# Patient Record
Sex: MALE | Race: WHITE | Employment: OTHER | URBAN - NONMETROPOLITAN AREA
[De-identification: names, ages, dates, MRNs, and addresses within clinical notes are randomized per-mention and may not be internally consistent; named-entity substitution may affect disease eponyms.]

---

## 2020-01-20 ENCOUNTER — OFFICE VISIT (OUTPATIENT)
Dept: FAMILY MEDICINE CLINIC | Age: 50
End: 2020-01-20
Payer: COMMERCIAL

## 2020-01-20 VITALS
OXYGEN SATURATION: 98 % | SYSTOLIC BLOOD PRESSURE: 130 MMHG | DIASTOLIC BLOOD PRESSURE: 82 MMHG | HEART RATE: 85 BPM | TEMPERATURE: 97.6 F | BODY MASS INDEX: 33.18 KG/M2 | WEIGHT: 237 LBS | HEIGHT: 71 IN

## 2020-01-20 PROCEDURE — 99213 OFFICE O/P EST LOW 20 MIN: CPT | Performed by: PHYSICIAN ASSISTANT

## 2020-01-20 RX ORDER — FENOFIBRATE 160 MG/1
TABLET ORAL
COMMUNITY
Start: 2012-02-07 | End: 2020-03-12 | Stop reason: SDUPTHER

## 2020-01-20 RX ORDER — TADALAFIL 20 MG/1
TABLET ORAL
COMMUNITY
Start: 2009-03-26 | End: 2021-04-01

## 2020-03-12 ENCOUNTER — OFFICE VISIT (OUTPATIENT)
Dept: FAMILY MEDICINE CLINIC | Age: 50
End: 2020-03-12
Payer: COMMERCIAL

## 2020-03-12 VITALS
WEIGHT: 234.4 LBS | RESPIRATION RATE: 16 BRPM | DIASTOLIC BLOOD PRESSURE: 78 MMHG | SYSTOLIC BLOOD PRESSURE: 116 MMHG | TEMPERATURE: 97.9 F | HEART RATE: 96 BPM | BODY MASS INDEX: 32.69 KG/M2 | OXYGEN SATURATION: 98 %

## 2020-03-12 LAB
BILIRUBIN, POC: NORMAL
BLOOD URINE, POC: NEGATIVE
CLARITY, POC: CLEAR
COLOR, POC: YELLOW
GLUCOSE URINE, POC: NEGATIVE
KETONES, POC: NEGATIVE
LEUKOCYTE EST, POC: NEGATIVE
NITRITE, POC: NEGATIVE
PH, POC: 5.5
PROTEIN, POC: NEGATIVE
SPECIFIC GRAVITY, POC: >=1.03
UROBILINOGEN, POC: 0.2

## 2020-03-12 PROCEDURE — 81002 URINALYSIS NONAUTO W/O SCOPE: CPT | Performed by: FAMILY MEDICINE

## 2020-03-12 PROCEDURE — 99213 OFFICE O/P EST LOW 20 MIN: CPT | Performed by: FAMILY MEDICINE

## 2020-03-12 RX ORDER — TADALAFIL 20 MG/1
20 TABLET ORAL
Qty: 30 TABLET | Status: CANCELLED | OUTPATIENT
Start: 2020-03-12

## 2020-03-12 RX ORDER — FENOFIBRATE 160 MG/1
160 TABLET ORAL DAILY
Qty: 90 TABLET | Refills: 1 | Status: SHIPPED | OUTPATIENT
Start: 2020-03-12 | End: 2021-04-01

## 2020-03-12 ASSESSMENT — ENCOUNTER SYMPTOMS
SHORTNESS OF BREATH: 0
COUGH: 0
BLOOD IN STOOL: 0
ABDOMINAL PAIN: 0

## 2020-03-12 ASSESSMENT — PATIENT HEALTH QUESTIONNAIRE - PHQ9
SUM OF ALL RESPONSES TO PHQ9 QUESTIONS 1 & 2: 0
SUM OF ALL RESPONSES TO PHQ QUESTIONS 1-9: 0
1. LITTLE INTEREST OR PLEASURE IN DOING THINGS: 0
SUM OF ALL RESPONSES TO PHQ QUESTIONS 1-9: 0
2. FEELING DOWN, DEPRESSED OR HOPELESS: 0

## 2021-04-01 ENCOUNTER — OFFICE VISIT (OUTPATIENT)
Dept: PODIATRY | Age: 51
End: 2021-04-01
Payer: COMMERCIAL

## 2021-04-01 VITALS — BODY MASS INDEX: 32.2 KG/M2 | WEIGHT: 230 LBS | HEIGHT: 71 IN

## 2021-04-01 DIAGNOSIS — M92.61 HAGLUND'S DEFORMITY OF BOTH HEELS: ICD-10-CM

## 2021-04-01 DIAGNOSIS — M76.60 PAIN IN ACHILLES TENDON: ICD-10-CM

## 2021-04-01 DIAGNOSIS — M76.62 ACHILLES TENDINITIS OF BOTH LOWER EXTREMITIES: Primary | ICD-10-CM

## 2021-04-01 DIAGNOSIS — M76.61 ACHILLES TENDINITIS OF BOTH LOWER EXTREMITIES: Primary | ICD-10-CM

## 2021-04-01 DIAGNOSIS — M92.62 HAGLUND'S DEFORMITY OF BOTH HEELS: ICD-10-CM

## 2021-04-01 PROCEDURE — 99204 OFFICE O/P NEW MOD 45 MIN: CPT | Performed by: PODIATRIST

## 2021-04-01 RX ORDER — TRIAMCINOLONE ACETONIDE 5 MG/G
CREAM TOPICAL
COMMUNITY
Start: 2018-07-31 | End: 2021-04-01

## 2021-04-01 NOTE — PROGRESS NOTES
New patient in office referred by PCP Dr Jesse Dwyer for bilat ankle pain and edema to left heel. Sx x 1 year. 21  Camila Severino : 1970 Sex: male  Age: 45 y.o. Patient was referred by Jahaira De Los Santos DO    CC:    Pain on the back of both Achilles tendon worse on the left    HPI:   This pleasant 58-year-old male patient referred me today pain on the back with Achilles tendon worse on the left. Has had increased symptoms for the past year. Does state has noticed swelling and tenderness on the back of both Achilles tendons which is worse after he is sitting and goes to stand up. States more he is on his feet the better it actually feels. Denies previous history of injury or any current treatment. Denies any over-the-counter or custom inserts. Does mostly wear boots. 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 Did have radiographs 3/11/2021 at [facility] both heels. Denies current calf pain. No additional pedal complaints at this time. ROS:  Const: Denies constitutional symptoms  Musculo: Denies symptoms other than stated above  Skin: Denies symptoms other than stated above       Current Outpatient Medications:     diclofenac sodium (VOLTAREN) 1 % GEL, Apply topically 2 times daily, Disp: 100 g, Rfl: 2  Allergies   Allergen Reactions    Penicillins      Other reaction(s): RASH       No past medical history on file. Vitals:    21 0807   Weight: 230 lb (104.3 kg)   Height: 5' 11\" (1.803 m)       Work History/Social History: Retired [employer] long term  Foot and ankle history:     Focused Lower Extremity Physical Exam:    Neurovascular examination:    Dorsalis Pedis palpable bilateral.  Posterior tibialis palpable bilateral.    Capillary Refill Time:  Immediate return  Hair growth:  Symmetrical and bilateral   Skin:  Not atrophic  Edema: No edema bilateral feet or ankles.   Neurologic:  Light touch intact bilateral. Musculoskeletal/ Orthopedic examination:    Equinis: Absent bilateral  Dorsiflexion, plantarflexion, inversion, eversion bilateral 5 out of 5 muscle strength  Wiggling toes  Negative Homans  Negative Lepe  Tenderness to palpation mid substance and insertion Achilles tendon bilateral worse on the left. No pain to palpation bilateral plantar fascia. Negative calcaneal squeeze test.  Prominent Arturo's posterior left calcaneus. Tenderness to palpation. Dermatology examination:    Mid substance and insertional Achilles tendon swelling bilateral worse on the left. Assessment and Plan:  Taryn Burciaga was seen today for ankle pain. Diagnoses and all orders for this visit:    Achilles tendinitis of both lower extremities    Arturo's deformity of both heels    Pain in Achilles tendon    Other orders  -     diclofenac sodium (VOLTAREN) 1 % GEL; Apply topically 2 times daily      Taryn Burciaga is seen new referral Arturo's deformity with Achilles tendinitis bilateral worse on the left    Did review radiographs from [facility] bilateral ankle 3/11/2021. Clinically does present as Arturo's deformity with insertional Achilles tendinitis worse on the left. Voltaren 1% twice daily to be applied to back both Achilles tendons. I did recommend Tuli's heel cups left and right. We did discuss conservative or surgical options. He does do landscape work in the summer. We will continue conservative treatment with heel cups and Voltaren. He would be a surgical candidate for Arthrex speed bridge with Arturo's resection and Achilles tendon repair. I will follow-up 4 months. He is also scheduled to go on an elk hunting trip in the fall. I will see him prior to this. Return in about 4 months (around 8/1/2021). Seen By:  Angel Yarbrough DPM      Document was created using voice recognition software. Note was reviewed, however may contain grammatical errors.

## 2021-04-01 NOTE — LETTER
Abigail Velasquez DO   [address] [id number]     Patient: Adi Zavala  YOB: 1970  Date of Visit: 4/1/2021     Dear Abigail Velasquez DO    Thank you for referring Adi Zavala to me for evaluation. Janelle Yang was seen bilateral Achilles tendinitis with Arturo's deformity worse on the left. Did recommend Tuli's heel cups. Did discuss conservative or surgical options. He would be a surgical candidate for speed bridge repair with Achilles debridement and resection Arturo's deformity left. We will continue with conservative treatment for the summer and will follow up in fall. Once again, thank you very much for this kind referral and allowing me to participate in the care of this patient. If you have questions, please do not hesitate to call me.     Sincerely,        JORGE Rabago [facility]  [address] [id number]  Phone: [phone]  Fax: [phone]

## 2021-06-16 ENCOUNTER — OFFICE VISIT (OUTPATIENT)
Dept: FAMILY MEDICINE CLINIC | Age: 51
End: 2021-06-16
Payer: COMMERCIAL

## 2021-06-16 VITALS
BODY MASS INDEX: 31.98 KG/M2 | HEART RATE: 78 BPM | DIASTOLIC BLOOD PRESSURE: 78 MMHG | WEIGHT: 228.4 LBS | TEMPERATURE: 97.3 F | HEIGHT: 71 IN | OXYGEN SATURATION: 96 % | SYSTOLIC BLOOD PRESSURE: 118 MMHG | RESPIRATION RATE: 18 BRPM

## 2021-06-16 DIAGNOSIS — J40 BRONCHITIS: Primary | ICD-10-CM

## 2021-06-16 DIAGNOSIS — Z20.822 EXPOSURE TO COVID-19 VIRUS: ICD-10-CM

## 2021-06-16 PROBLEM — U07.1 COVID-19: Status: ACTIVE | Noted: 2021-06-16

## 2021-06-16 LAB
INFLUENZA A ANTIBODY: NEGATIVE
INFLUENZA B ANTIBODY: NEGATIVE
Lab: NORMAL
PERFORMING INSTRUMENT: NORMAL
QC PASS/FAIL: NORMAL
SARS-COV-2, POC: NORMAL

## 2021-06-16 PROCEDURE — 96372 THER/PROPH/DIAG INJ SC/IM: CPT | Performed by: FAMILY MEDICINE

## 2021-06-16 PROCEDURE — 87426 SARSCOV CORONAVIRUS AG IA: CPT | Performed by: FAMILY MEDICINE

## 2021-06-16 PROCEDURE — 87804 INFLUENZA ASSAY W/OPTIC: CPT | Performed by: FAMILY MEDICINE

## 2021-06-16 PROCEDURE — 99213 OFFICE O/P EST LOW 20 MIN: CPT | Performed by: FAMILY MEDICINE

## 2021-06-16 RX ORDER — AZITHROMYCIN 250 MG/1
250 TABLET, FILM COATED ORAL SEE ADMIN INSTRUCTIONS
Qty: 6 TABLET | Refills: 0 | Status: SHIPPED | OUTPATIENT
Start: 2021-06-16 | End: 2021-06-21

## 2021-06-16 RX ORDER — METHYLPREDNISOLONE 4 MG/1
TABLET ORAL
Qty: 1 KIT | Refills: 0 | Status: SHIPPED
Start: 2021-06-16 | End: 2021-08-05

## 2021-06-16 RX ORDER — METHYLPREDNISOLONE ACETATE 80 MG/ML
80 INJECTION, SUSPENSION INTRA-ARTICULAR; INTRALESIONAL; INTRAMUSCULAR; SOFT TISSUE ONCE
Status: COMPLETED | OUTPATIENT
Start: 2021-06-16 | End: 2021-06-16

## 2021-06-16 RX ORDER — ALBUTEROL SULFATE 90 UG/1
2 AEROSOL, METERED RESPIRATORY (INHALATION) 4 TIMES DAILY PRN
Qty: 3 INHALER | Refills: 1 | Status: SHIPPED
Start: 2021-06-16 | End: 2021-08-05

## 2021-06-16 RX ADMIN — METHYLPREDNISOLONE ACETATE 80 MG: 80 INJECTION, SUSPENSION INTRA-ARTICULAR; INTRALESIONAL; INTRAMUSCULAR; SOFT TISSUE at 11:25

## 2021-06-16 ASSESSMENT — ENCOUNTER SYMPTOMS
TROUBLE SWALLOWING: 0
SINUS PRESSURE: 1
CHEST TIGHTNESS: 1
SORE THROAT: 1
EYES NEGATIVE: 1
SINUS PAIN: 1
GASTROINTESTINAL NEGATIVE: 1
RHINORRHEA: 1
COUGH: 1
SHORTNESS OF BREATH: 1

## 2021-06-16 NOTE — PROGRESS NOTES
Tia Tadeo (:  1970) is a 45 y.o. male,Established patient, here for evaluation of the following chief complaint(s):  Fever, Sweats, and Fatigue         ASSESSMENT/PLAN:  1. COVID-19  -     POCT Influenza A/B  -     POCT COVID-19, Antigen  -     COVID-19 Ambulatory  -     azithromycin (ZITHROMAX) 250 MG tablet; Take 1 tablet by mouth See Admin Instructions for 5 days 500mg on day 1 followed by 250mg on days 2 - 5, Disp-6 tablet, R-0Normal  -     albuterol sulfate HFA (VENTOLIN HFA) 108 (90 Base) MCG/ACT inhaler; Inhale 2 puffs into the lungs 4 times daily as needed for Wheezing, Disp-3 Inhaler, R-1Normal  -     methylPREDNISolone (MEDROL DOSEPACK) 4 MG tablet; Take by mouth., Disp-1 kit, R-0Normal  -     methylPREDNISolone acetate (DEPO-MEDROL) injection 80 mg; 80 mg, Intramuscular, ONCE, On 21 at 1115, For 1 dose  2. Bronchitis  -     azithromycin (ZITHROMAX) 250 MG tablet; Take 1 tablet by mouth See Admin Instructions for 5 days 500mg on day 1 followed by 250mg on days 2 - 5, Disp-6 tablet, R-0Normal  -     albuterol sulfate HFA (VENTOLIN HFA) 108 (90 Base) MCG/ACT inhaler; Inhale 2 puffs into the lungs 4 times daily as needed for Wheezing, Disp-3 Inhaler, R-1Normal  -     methylPREDNISolone (MEDROL DOSEPACK) 4 MG tablet; Take by mouth., Disp-1 kit, R-0Normal  -     methylPREDNISolone acetate (DEPO-MEDROL) injection 80 mg; 80 mg, Intramuscular, ONCE, On 21 at 1115, For 1 dose  In office testing negative. Advised quarantine until symptom test has returned. Will treat empirically. Red flags discussed with patient. If any of these occur he is to go directly to the emergency department. Patient voiced understanding. No follow-ups on file. Subjective   SUBJECTIVE/OBJECTIVE:  HPI  Presents today for evaluation of at least 1 week of worsening symptoms including fever, chills, aches, and fatigue. Patient also admits to mildly productive cough.   He was exposed sometime last Social History Narrative    Not on file     Social Determinants of Health     Financial Resource Strain:     Difficulty of Paying Living Expenses:    Food Insecurity:     Worried About Running Out of Food in the Last Year:     [address] in the Last Year:    Transportation Needs:     Lack of Transportation (Medical):  Lack of Transportation (Non-Medical):    Physical Activity:     Days of Exercise per Week:     Minutes of Exercise per Session:    Stress:     Feeling of Stress :    Social Connections:     Frequency of Communication with Friends and Family:     Frequency of Social Gatherings with Friends and Family:     Attends Worship Services:     Active Member of Clubs or Organizations:     Attends Club or Organization Meetings:     Marital Status:    Intimate Partner Violence:     Fear of Current or Ex-Partner:     Emotionally Abused:     Physically Abused:     Sexually Abused:      History reviewed. No pertinent family history. Health Maintenance Due   Topic Date Due    Colon cancer screen colonoscopy  Never done     There are no preventive care reminders to display for this patient. Diabetes Management   Topic Date Due    Lipid screen  Never done      Health Maintenance Due   Topic    Shingles Vaccine (1 of 2)      Health Maintenance   Topic Date Due    Hepatitis C screen  Never done    COVID-19 Vaccine (1) Never done    HIV screen  Never done    Lipid screen  Never done    Diabetes screen  Never done    Shingles Vaccine (1 of 2) Never done    Colon cancer screen colonoscopy  Never done    Flu vaccine (Season Ended) 09/01/2021    DTaP/Tdap/Td vaccine (2 - Td or Tdap) 01/20/2030    Hepatitis A vaccine  Aged Out    Hepatitis B vaccine  Aged Out    Hib vaccine  Aged Out    Meningococcal (ACWY) vaccine  Aged Out    Pneumococcal 0-64 years Vaccine  Aged Out      There are no preventive care reminders to display for this patient.    There are no preventive care reminders to display for this patient. /78   Pulse 78   Temp 97.3 °F (36.3 °C)   Resp 18   Ht 5' 11\" (1.803 m)   Wt 228 lb 6.4 oz (103.6 kg)   SpO2 96%   BMI 31.86 kg/m²       Objective   Physical Exam  Vitals reviewed. Constitutional:       Appearance: He is well-developed. He is ill-appearing. HENT:      Head: Normocephalic and atraumatic. Right Ear: External ear normal.      Left Ear: External ear normal.      Nose: Nose normal.      Mouth/Throat:      Pharynx: Posterior oropharyngeal erythema present. Eyes:      Conjunctiva/sclera: Conjunctivae normal.      Pupils: Pupils are equal, round, and reactive to light. Cardiovascular:      Rate and Rhythm: Normal rate and regular rhythm. Heart sounds: Normal heart sounds. Pulmonary:      Effort: Pulmonary effort is normal.      Breath sounds: Decreased breath sounds and wheezing present. Abdominal:      General: Bowel sounds are normal.      Palpations: Abdomen is soft. Musculoskeletal:         General: Normal range of motion. Cervical back: Normal range of motion and neck supple. Skin:     General: Skin is warm and dry. Findings: No erythema. Neurological:      Mental Status: He is alert and oriented to person, place, and time. Cranial Nerves: No cranial nerve deficit. Psychiatric:         Behavior: Behavior normal.         Judgment: Judgment normal.                  An electronic signature was used to authenticate this note.     --Randy Longo DO

## 2021-06-18 LAB
SARS-COV-2: DETECTED
SOURCE: ABNORMAL

## 2021-06-18 NOTE — RESULT ENCOUNTER NOTE
Covid test is positive. Please quarantine for 10 to 14 days. Return to emergency department if symptoms worsen. Return for repeat testing as indicated by symptoms or employer.

## 2021-08-05 ENCOUNTER — OFFICE VISIT (OUTPATIENT)
Dept: PODIATRY | Age: 51
End: 2021-08-05
Payer: COMMERCIAL

## 2021-08-05 ENCOUNTER — OFFICE VISIT (OUTPATIENT)
Dept: FAMILY MEDICINE CLINIC | Age: 51
End: 2021-08-05
Payer: COMMERCIAL

## 2021-08-05 VITALS
HEART RATE: 62 BPM | TEMPERATURE: 97.5 F | WEIGHT: 224 LBS | BODY MASS INDEX: 31.24 KG/M2 | OXYGEN SATURATION: 98 % | SYSTOLIC BLOOD PRESSURE: 116 MMHG | DIASTOLIC BLOOD PRESSURE: 80 MMHG

## 2021-08-05 DIAGNOSIS — M92.61 HAGLUND'S DEFORMITY OF BOTH HEELS: ICD-10-CM

## 2021-08-05 DIAGNOSIS — M92.62 HAGLUND'S DEFORMITY OF BOTH HEELS: ICD-10-CM

## 2021-08-05 DIAGNOSIS — M76.60 PAIN IN ACHILLES TENDON: ICD-10-CM

## 2021-08-05 DIAGNOSIS — M76.62 ACHILLES TENDINITIS OF BOTH LOWER EXTREMITIES: Primary | ICD-10-CM

## 2021-08-05 DIAGNOSIS — L23.7 POISON IVY DERMATITIS: Primary | ICD-10-CM

## 2021-08-05 DIAGNOSIS — M76.61 ACHILLES TENDINITIS OF BOTH LOWER EXTREMITIES: Primary | ICD-10-CM

## 2021-08-05 PROCEDURE — 99213 OFFICE O/P EST LOW 20 MIN: CPT | Performed by: STUDENT IN AN ORGANIZED HEALTH CARE EDUCATION/TRAINING PROGRAM

## 2021-08-05 PROCEDURE — 96372 THER/PROPH/DIAG INJ SC/IM: CPT | Performed by: STUDENT IN AN ORGANIZED HEALTH CARE EDUCATION/TRAINING PROGRAM

## 2021-08-05 PROCEDURE — 99213 OFFICE O/P EST LOW 20 MIN: CPT | Performed by: PODIATRIST

## 2021-08-05 RX ORDER — TRIAMCINOLONE ACETONIDE 40 MG/ML
40 INJECTION, SUSPENSION INTRA-ARTICULAR; INTRAMUSCULAR ONCE
Status: COMPLETED | OUTPATIENT
Start: 2021-08-05 | End: 2021-08-05

## 2021-08-05 RX ORDER — BETAMETHASONE DIPROPIONATE 0.05 %
OINTMENT (GRAM) TOPICAL
Qty: 1 TUBE | Refills: 3 | Status: SHIPPED | OUTPATIENT
Start: 2021-08-05

## 2021-08-05 RX ORDER — PREDNISONE 10 MG/1
TABLET ORAL
Qty: 18 TABLET | Refills: 0 | Status: SHIPPED
Start: 2021-08-05 | End: 2021-12-16

## 2021-08-05 RX ADMIN — TRIAMCINOLONE ACETONIDE 40 MG: 40 INJECTION, SUSPENSION INTRA-ARTICULAR; INTRAMUSCULAR at 09:23

## 2021-08-05 ASSESSMENT — ENCOUNTER SYMPTOMS
CHEST TIGHTNESS: 0
BACK PAIN: 0
SHORTNESS OF BREATH: 0
EYE PAIN: 0
NAUSEA: 0
COUGH: 0
ABDOMINAL PAIN: 0
VOMITING: 0
DIARRHEA: 0
RHINORRHEA: 0
CONSTIPATION: 0

## 2021-08-05 NOTE — PROGRESS NOTES
Patient in office to follow up with left foot pain. Continues to have pain. Ziggy Foley : 1970 Sex: male  Age: 45 y.o. Patient was referred by [id number] [place] [address],     CC:    Follow-up I was deformity and Achilles tendinitis worse on the left        HPI:   Follow-up I was deformity and Achilles tendinitis worse on the left  Tolerating heel cups well. States he has noticed improvement. Still does cut grass and states when he is sitting the first few steps after he gets up still having tenderness on the back left Achilles tendon. No calf pain today. Would like to discuss surgical options going forward as he does wish to proceed with surgery hopefully in the winter. Scheduled to go on a hiking trip where he will be doing [place] in for almost 2 weeks. No recent injury or trauma. No additional pedal complaints today. ROS:  Const: Denies constitutional symptoms  Musculo: Denies symptoms other than stated above  Skin: Denies symptoms other than stated above       Current Outpatient Medications:     betamethasone dipropionate (DIPROLENE) 0.05 % ointment, Apply topically daily. , Disp: 1 Tube, Rfl: 3    predniSONE (DELTASONE) 10 MG tablet, Take 3 pills for 3 days take 2 pills for 3 days take 1 pill for 3 days, Disp: 18 tablet, Rfl: 0  Allergies   Allergen Reactions    Penicillins      Other reaction(s): RASH       No past medical history on file. There were no vitals filed for this visit. Work History/Social History: Retired [address] and ankle history:     Focused Lower Extremity Physical Exam:    Neurovascular examination:    Dorsalis Pedis palpable bilateral.  Posterior tibialis palpable bilateral.    Capillary Refill Time:  Immediate return  Hair growth:  Symmetrical and bilateral   Skin:  Not atrophic  Edema: No edema bilateral feet or ankles.   Neurologic:  Light touch intact bilateral.      Musculoskeletal/ Orthopedic examination:    Equinis: Absent bilateral  Dorsiflexion, plantarflexion, inversion, eversion bilateral 5 out of 5 muscle strength  Wiggling toes  Negative Homans  Negative Lepe  Tenderness mid substance and insertion bilateral Achilles tendon worse on the left. There is tenderness where there is prominent Arturo's deformity posterior calcaneus worse on the left. Dermatology examination:    Mid substance and insertional Achilles tendon swelling bilateral worse on the left. Assessment and Plan:  Holley Can was seen today for follow-up and foot pain. Diagnoses and all orders for this visit:    Achilles tendinitis of both lower extremities  -     MRI ANKLE LEFT WO CONTRAST; Future    Arturo's deformity of both heels  -     MRI ANKLE LEFT WO CONTRAST; Future    Pain in Achilles tendon  -     MRI ANKLE LEFT WO CONTRAST; Future      Radiographs from [facility] bilateral ankle 3/11/2021. Follow-up Achilles tendinitis with prominent Arturo's deformity worse on the left  Did order MRI left ankle. Continue heel cups. Continue Voltaren 1% gel twice daily back of left heel. He is going on a nail climbing trip upcoming and will be doing more walking. Stressed importance of heel cups. We did discuss conservative or surgical options. Would be surgical candidate for Arthrex speed bridge with resection prominent calcaneal spur. He would like to have this done after wrestling season is over February or March. We will follow-up in February. Will need surgical clearance within 30 days of surgery from Dr. Ivelisse Rascon. Return in about 6 months (around 2/5/2022). Seen By:  Marvin Arevalo DPM      Document was created using voice recognition software. Note was reviewed, however may contain grammatical errors.

## 2021-08-05 NOTE — PROGRESS NOTES
2021    Jarrett Patten (:  1970) is a 45 y.o. male, here for evaluation of the following medical concerns:    HPI  Chief Complaint   Patient presents with   [facility]     Patient presents for evaluation of rash on bilateral arms. Onset of symptoms was abrupt 6 days ago, and has been gradually worsening since that time. . Symptoms include itching and weeping. Care prior to arrival consisted of benadryl, with minimal relief. Review of Systems   Constitutional: Negative for chills, fatigue and fever. HENT: Negative for congestion, ear pain, postnasal drip and rhinorrhea. Eyes: Negative for pain. Respiratory: Negative for cough, chest tightness and shortness of breath. Cardiovascular: Negative for chest pain. Gastrointestinal: Negative for abdominal pain, constipation, diarrhea, nausea and vomiting. Genitourinary: Negative for difficulty urinating, dysuria and frequency. Musculoskeletal: Negative for back pain and myalgias. Skin: Positive for rash. Neurological: Negative for dizziness, light-headedness, numbness and headaches. Prior to Visit Medications    Medication Sig Taking? Authorizing Provider   betamethasone dipropionate (DIPROLENE) 0.05 % ointment Apply topically daily. Yes Sveta Mathis DO   predniSONE (DELTASONE) 10 MG tablet Take 3 pills for 3 days take 2 pills for 3 days take 1 pill for 3 days Yes Sveta Mathis DO        Allergies   Allergen Reactions    Penicillins      Other reaction(s): RASH       History reviewed. No pertinent past medical history. History reviewed. No pertinent surgical history.     Social History     Socioeconomic History    Marital status:      Spouse name: Not on file    Number of children: Not on file    Years of education: Not on file    Highest education level: Not on file   Occupational History    Not on file   Tobacco Use    Smoking status: Never Smoker    Smokeless tobacco: Never Used   Substance and Sexual Activity    Alcohol use: Not on file    Drug use: Not on file    Sexual activity: Not on file   Other Topics Concern    Not on file   Social History Narrative    Not on file     Social Determinants of Health     Financial Resource Strain:     Difficulty of Paying Living Expenses:    Food Insecurity:     Worried About Running Out of Food in the Last Year:     [address] in the Last Year:    Transportation Needs:     Lack of Transportation (Medical):  Lack of Transportation (Non-Medical):    Physical Activity:     Days of Exercise per Week:     Minutes of Exercise per Session:    Stress:     Feeling of Stress :    Social Connections:     Frequency of Communication with Friends and Family:     Frequency of Social Gatherings with Friends and Family:     Attends Latter-[employer] Services:     Active Member of Clubs or Organizations:     Attends Club or Organization Meetings:     Marital Status:    Intimate Partner Violence:     Fear of Current or Ex-Partner:     Emotionally Abused:     Physically Abused:     Sexually Abused:         History reviewed. No pertinent family history. Vitals:    08/05/21 0858   BP: 116/80   Pulse: 62   Temp: 97.5 °F (36.4 °C)   SpO2: 98%   Weight: 224 lb (101.6 kg)     Estimated body mass index is 31.24 kg/m² as calculated from the following:    Height as of 6/16/21: 5' 11\" (1.803 m). Weight as of this encounter: 224 lb (101.6 kg).     Most Recent Labs  CBC  Lab Results   Component Value Date    WBC 9.7 06/18/2021    RBC 4.09 06/18/2021    HGB 13.1 06/18/2021    HCT 37.5 06/18/2021    MCV 91.7 06/18/2021     06/18/2021      CMP  Lab Results   Component Value Date     06/18/2021    K 4.4 06/18/2021    CL 99 06/18/2021    CO2 25 06/18/2021    ANIONGAP 11.0 06/18/2021    GLUCOSE 124 06/18/2021    BUN 16 06/18/2021    CREATININE 0.9 06/18/2021    LABGLOM 89 06/18/2021    CALCIUM 8.9 06/18/2021    PROT 7.7 06/18/2021    LABALBU 4.1 06/18/2021    BILITOT 1.0 06/18/2021    ALKPHOS 59 06/18/2021    AST 90 06/18/2021    ALT 78 06/18/2021     UA  Lab Results   Component Value Date    COLORU yellow 03/12/2020    CLARITYU clear 03/12/2020    GLUCOSEU negative 03/12/2020    BILIRUBINUR small 03/12/2020    KETUA negative 03/12/2020    SPECGRAV >=1.030 03/12/2020    BLOODU negative 03/12/2020    PHUR 5.5 03/12/2020    PROTEINU negative 03/12/2020    LEUKOCYTESUR negative 03/12/2020         Physical Exam  Vitals and nursing note reviewed. Constitutional:       Appearance: Normal appearance. HENT:      Head: Normocephalic and atraumatic. Right Ear: External ear normal.      Left Ear: External ear normal.   Eyes:      Extraocular Movements: Extraocular movements intact. Pupils: Pupils are equal, round, and reactive to light. Cardiovascular:      Rate and Rhythm: Normal rate and regular rhythm. Pulses: Normal pulses. Heart sounds: Normal heart sounds. Pulmonary:      Breath sounds: Normal breath sounds. Abdominal:      General: Abdomen is flat. Bowel sounds are normal.      Palpations: Abdomen is soft. Musculoskeletal:         General: Normal range of motion. Cervical back: Normal range of motion. Skin:     General: Skin is warm and dry. Capillary Refill: Capillary refill takes less than 2 seconds. Findings: Rash (poison ivy on arms and some spots on legs and scattered throughout body ) present. Neurological:      General: No focal deficit present. Mental Status: He is alert and oriented to person, place, and time. Psychiatric:         Mood and Affect: Mood normal.         Behavior: Behavior normal.         Thought Content: Thought content normal.         ASSESSMENT/PLAN:  Alex Johnson was seen today for poison ivy. Diagnoses and all orders for this visit:    Poison ivy dermatitis  -     triamcinolone acetonide (KENALOG-40) injection 40 mg  -     betamethasone dipropionate (DIPROLENE) 0.05 % ointment;  Apply topically daily.  -     predniSONE (DELTASONE) 10 MG tablet; Take 3 pills for 3 days take 2 pills for 3 days take 1 pill for 3 days              Counseled regarding above diagnosis, including possible risks and complications,  especially if left uncontrolled. Counseled regarding the possible side effects, risks, benefits and alternatives to treatment; patient and/or guardian verbalizes understanding, agrees, feels comfortable with and wishes to proceed with above treatment plan. Advised patient to call with any new medication issues, and read all Rx info from pharmacy to assure aware of all possible risks and side effects of medication before taking. Patient and/or guardian verbalizes understanding and agrees with above counseling, assessment and plan. All questions answered. No follow-ups on file. An  electronic signature was used to authenticate this note.     --Gena Mccracken DO on 8/5/2021 at 11:20 AM

## 2021-12-16 ENCOUNTER — OFFICE VISIT (OUTPATIENT)
Dept: PODIATRY | Age: 51
End: 2021-12-16
Payer: COMMERCIAL

## 2021-12-16 DIAGNOSIS — M92.62 HAGLUND'S DEFORMITY, LEFT: ICD-10-CM

## 2021-12-16 DIAGNOSIS — M76.60 PAIN IN ACHILLES TENDON: ICD-10-CM

## 2021-12-16 DIAGNOSIS — M76.62 ACHILLES TENDINITIS OF BOTH LOWER EXTREMITIES: Primary | ICD-10-CM

## 2021-12-16 DIAGNOSIS — M76.61 ACHILLES TENDINITIS OF BOTH LOWER EXTREMITIES: Primary | ICD-10-CM

## 2021-12-16 DIAGNOSIS — M79.672 LEFT FOOT PAIN: ICD-10-CM

## 2021-12-16 PROCEDURE — 99214 OFFICE O/P EST MOD 30 MIN: CPT | Performed by: PODIATRIST

## 2021-12-16 NOTE — PROGRESS NOTES
Patient in office to follow up with left foot pain. Continues to have pain. Requesting to move surgery date up. Did not have MRI completed. Alan Guevara : 1970 Sex: male  Age: 45 y.o. Patient was referred by Godfrey Olmos DO    CC:    Follow-up Achilles tendinitis and pain left heel      HPI:   Kaylynn Dhillon presents today follow-up Achilles tendinitis and pain left heel. Would like to discuss surgery as he does have continued symptoms with pain on the back of the left Achilles tendon and back of the left heel. Does stand on his feet and is a  where he is on his feet throughout the day. No recent injury but continues to have symptoms. No MRI completed at this time. Pain worse in the morning at the end the day does have swelling on the back of the left Achilles. No current calf pain. ROS:  Const: Denies constitutional symptoms  Musculo: Denies symptoms other than stated above  Skin: Denies symptoms other than stated above       Current Outpatient Medications:     betamethasone dipropionate (DIPROLENE) 0.05 % ointment, Apply topically daily. , Disp: 1 Tube, Rfl: 3  Allergies   Allergen Reactions    Penicillins      Other reaction(s): RASH       No past medical history on file. There were no vitals filed for this visit. Work History/Social History: Retired [place] and [address] [place] [address] Road and ankle history:     Focused Lower Extremity Physical Exam:    Neurovascular examination:    Dorsalis Pedis palpable bilateral.  Posterior tibialis palpable bilateral.    Capillary Refill Time:  Immediate return  Hair growth:  Symmetrical and bilateral   Skin:  Not atrophic  Edema: No edema bilateral feet or ankles.   Neurologic:  Light touch intact bilateral.      Musculoskeletal/ Orthopedic examination:    Equinis: Absent bilateral  Dorsiflexion, plantarflexion, inversion, eversion bilateral 5 out of 5 muscle strength  Wiggling toes  Negative Homans  There is tenderness mid substance and insertion Achilles tendon left. Prominent posterior Arturo's deformity left. There is tenderness to palpation entire posterior insertion Achilles tendon. Negative Lepe      Dermatology examination:    Mid substance and insertional Achilles tendon swelling bilateral worse on the left. Assessment and Plan:  Jatin Lo was seen today for follow-up and foot pain. Diagnoses and all orders for this visit:    Achilles tendinitis of both lower extremities  -     MRI ANKLE LEFT WO CONTRAST; Future    Pain in Achilles tendon  -     MRI ANKLE LEFT WO CONTRAST; Future    Left foot pain    Arturo's deformity, left  -     MRI ANKLE LEFT WO CONTRAST; Future      Radiographs from [facility] bilateral ankle 3/11/2021. Follow-up Achilles tendinitis and Arturo's deformity left  We did discuss conservative or surgical options he would like to continue to discuss surgical options today. He would be a surgical candidate likely for heel spur resection with Arthrex speed bridge Achilles tendon repair. Would be a period of likely 6 weeks nonweightbearing. I will place a new order for MRI to be done at [facility] left ankle for surgical planning. He will need surgical clearance by his primary care physician Dr. Ken Bennett within 30 days. I will follow-up after MRI left ankle. No follow-ups on file. Seen By:  Harrison Philippe DPM      Document was created using voice recognition software. Note was reviewed, however may contain grammatical errors.

## 2023-10-10 ENCOUNTER — APPOINTMENT (OUTPATIENT)
Dept: MRI IMAGING | Age: 53
End: 2023-10-10
Payer: COMMERCIAL

## 2023-10-10 ENCOUNTER — HOSPITAL ENCOUNTER (EMERGENCY)
Age: 53
Discharge: HOME | End: 2023-10-10
Attending: STUDENT IN AN ORGANIZED HEALTH CARE EDUCATION/TRAINING PROGRAM
Payer: COMMERCIAL

## 2023-10-10 ENCOUNTER — APPOINTMENT (OUTPATIENT)
Dept: CT IMAGING | Age: 53
End: 2023-10-10
Payer: COMMERCIAL

## 2023-10-10 VITALS
OXYGEN SATURATION: 96 % | HEIGHT: 71 IN | SYSTOLIC BLOOD PRESSURE: 125 MMHG | DIASTOLIC BLOOD PRESSURE: 75 MMHG | WEIGHT: 225 LBS | TEMPERATURE: 97.6 F | HEART RATE: 79 BPM | RESPIRATION RATE: 14 BRPM | BODY MASS INDEX: 31.5 KG/M2

## 2023-10-10 DIAGNOSIS — H81.399 PERIPHERAL VERTIGO, UNSPECIFIED LATERALITY: Primary | ICD-10-CM

## 2023-10-10 LAB
ALBUMIN SERPL-MCNC: 4.5 G/DL (ref 3.5–5.2)
ALP SERPL-CCNC: 54 U/L (ref 40–129)
ALT SERPL-CCNC: 36 U/L (ref 0–40)
ANION GAP SERPL CALCULATED.3IONS-SCNC: 9 MMOL/L (ref 7–16)
AST SERPL-CCNC: 24 U/L (ref 0–39)
BASOPHILS # BLD: 0.04 K/UL (ref 0–0.2)
BASOPHILS NFR BLD: 1 % (ref 0–2)
BILIRUB SERPL-MCNC: 0.9 MG/DL (ref 0–1.2)
BILIRUB UR QL STRIP: NEGATIVE
BUN SERPL-MCNC: 14 MG/DL (ref 6–20)
CALCIUM SERPL-MCNC: 9.7 MG/DL (ref 8.6–10.2)
CHLORIDE SERPL-SCNC: 106 MMOL/L (ref 98–107)
CLARITY UR: CLEAR
CO2 SERPL-SCNC: 24 MMOL/L (ref 22–29)
COLOR UR: YELLOW
COMMENT: NORMAL
CREAT SERPL-MCNC: 0.9 MG/DL (ref 0.7–1.2)
EOSINOPHIL # BLD: 0 K/UL (ref 0.05–0.5)
EOSINOPHILS RELATIVE PERCENT: 0 % (ref 0–6)
ERYTHROCYTE [DISTWIDTH] IN BLOOD BY AUTOMATED COUNT: 12.1 % (ref 11.5–15)
GFR SERPL CREATININE-BSD FRML MDRD: >60 ML/MIN/1.73M2
GLUCOSE SERPL-MCNC: 153 MG/DL (ref 74–99)
GLUCOSE UR STRIP-MCNC: NEGATIVE MG/DL
HCT VFR BLD AUTO: 37.6 % (ref 37–54)
HGB BLD-MCNC: 13.1 G/DL (ref 12.5–16.5)
HGB UR QL STRIP.AUTO: NEGATIVE
IMM GRANULOCYTES # BLD AUTO: 0.03 K/UL (ref 0–0.58)
IMM GRANULOCYTES NFR BLD: 0 % (ref 0–5)
KETONES UR STRIP-MCNC: NEGATIVE MG/DL
LEUKOCYTE ESTERASE UR QL STRIP: NEGATIVE
LYMPHOCYTES NFR BLD: 0.75 K/UL (ref 1.5–4)
LYMPHOCYTES RELATIVE PERCENT: 10 % (ref 20–42)
MCH RBC QN AUTO: 32.2 PG (ref 26–35)
MCHC RBC AUTO-ENTMCNC: 34.8 G/DL (ref 32–34.5)
MCV RBC AUTO: 92.4 FL (ref 80–99.9)
MONOCYTES NFR BLD: 0.31 K/UL (ref 0.1–0.95)
MONOCYTES NFR BLD: 4 % (ref 2–12)
NEUTROPHILS NFR BLD: 85 % (ref 43–80)
NEUTS SEG NFR BLD: 6.53 K/UL (ref 1.8–7.3)
NITRITE UR QL STRIP: NEGATIVE
PH UR STRIP: 7.5 [PH] (ref 5–9)
PLATELET # BLD AUTO: 304 K/UL (ref 130–450)
PMV BLD AUTO: 9.1 FL (ref 7–12)
POTASSIUM SERPL-SCNC: 4.6 MMOL/L (ref 3.5–5)
PROT SERPL-MCNC: 7.2 G/DL (ref 6.4–8.3)
PROT UR STRIP-MCNC: NEGATIVE MG/DL
RBC # BLD AUTO: 4.07 M/UL (ref 3.8–5.8)
SODIUM SERPL-SCNC: 139 MMOL/L (ref 132–146)
SP GR UR STRIP: 1.01 (ref 1–1.03)
TROPONIN I SERPL HS-MCNC: 10 NG/L (ref 0–11)
TROPONIN I SERPL HS-MCNC: 12 NG/L (ref 0–11)
UROBILINOGEN UR STRIP-ACNC: 0.2 EU/DL (ref 0–1)
WBC OTHER # BLD: 7.7 K/UL (ref 4.5–11.5)

## 2023-10-10 PROCEDURE — 6370000000 HC RX 637 (ALT 250 FOR IP): Performed by: STUDENT IN AN ORGANIZED HEALTH CARE EDUCATION/TRAINING PROGRAM

## 2023-10-10 PROCEDURE — 70551 MRI BRAIN STEM W/O DYE: CPT

## 2023-10-10 PROCEDURE — 6360000004 HC RX CONTRAST MEDICATION: Performed by: RADIOLOGY

## 2023-10-10 PROCEDURE — 99285 EMERGENCY DEPT VISIT HI MDM: CPT

## 2023-10-10 PROCEDURE — 85025 COMPLETE CBC W/AUTO DIFF WBC: CPT

## 2023-10-10 PROCEDURE — 80053 COMPREHEN METABOLIC PANEL: CPT

## 2023-10-10 PROCEDURE — 81003 URINALYSIS AUTO W/O SCOPE: CPT

## 2023-10-10 PROCEDURE — 93005 ELECTROCARDIOGRAM TRACING: CPT | Performed by: STUDENT IN AN ORGANIZED HEALTH CARE EDUCATION/TRAINING PROGRAM

## 2023-10-10 PROCEDURE — 96374 THER/PROPH/DIAG INJ IV PUSH: CPT

## 2023-10-10 PROCEDURE — 70496 CT ANGIOGRAPHY HEAD: CPT

## 2023-10-10 PROCEDURE — 6360000002 HC RX W HCPCS: Performed by: STUDENT IN AN ORGANIZED HEALTH CARE EDUCATION/TRAINING PROGRAM

## 2023-10-10 PROCEDURE — 70498 CT ANGIOGRAPHY NECK: CPT

## 2023-10-10 PROCEDURE — 2580000003 HC RX 258: Performed by: STUDENT IN AN ORGANIZED HEALTH CARE EDUCATION/TRAINING PROGRAM

## 2023-10-10 PROCEDURE — 84484 ASSAY OF TROPONIN QUANT: CPT

## 2023-10-10 RX ORDER — DIAZEPAM 5 MG/1
10 TABLET ORAL EVERY 8 HOURS PRN
Qty: 10 TABLET | Refills: 0 | Status: SHIPPED | OUTPATIENT
Start: 2023-10-10 | End: 2023-10-13

## 2023-10-10 RX ORDER — DIAZEPAM 5 MG/1
5 TABLET ORAL ONCE
Status: COMPLETED | OUTPATIENT
Start: 2023-10-10 | End: 2023-10-10

## 2023-10-10 RX ORDER — 0.9 % SODIUM CHLORIDE 0.9 %
1000 INTRAVENOUS SOLUTION INTRAVENOUS ONCE
Status: COMPLETED | OUTPATIENT
Start: 2023-10-10 | End: 2023-10-10

## 2023-10-10 RX ORDER — ONDANSETRON 4 MG/1
4 TABLET, ORALLY DISINTEGRATING ORAL 3 TIMES DAILY PRN
Qty: 21 TABLET | Refills: 0 | Status: SHIPPED | OUTPATIENT
Start: 2023-10-10

## 2023-10-10 RX ORDER — ONDANSETRON 2 MG/ML
4 INJECTION INTRAMUSCULAR; INTRAVENOUS ONCE
Status: COMPLETED | OUTPATIENT
Start: 2023-10-10 | End: 2023-10-10

## 2023-10-10 RX ORDER — MECLIZINE HCL 12.5 MG/1
25 TABLET ORAL ONCE
Status: COMPLETED | OUTPATIENT
Start: 2023-10-10 | End: 2023-10-10

## 2023-10-10 RX ADMIN — SODIUM CHLORIDE 1000 ML: 9 INJECTION, SOLUTION INTRAVENOUS at 11:10

## 2023-10-10 RX ADMIN — MECLIZINE 25 MG: 12.5 TABLET ORAL at 14:11

## 2023-10-10 RX ADMIN — DIAZEPAM 5 MG: 5 TABLET ORAL at 11:11

## 2023-10-10 RX ADMIN — IOPAMIDOL 75 ML: 755 INJECTION, SOLUTION INTRAVENOUS at 11:51

## 2023-10-10 RX ADMIN — ONDANSETRON 4 MG: 2 INJECTION INTRAMUSCULAR; INTRAVENOUS at 11:11

## 2023-10-10 ASSESSMENT — PAIN - FUNCTIONAL ASSESSMENT: PAIN_FUNCTIONAL_ASSESSMENT: NONE - DENIES PAIN

## 2023-10-10 ASSESSMENT — LIFESTYLE VARIABLES
HOW MANY STANDARD DRINKS CONTAINING ALCOHOL DO YOU HAVE ON A TYPICAL DAY: PATIENT DOES NOT DRINK
HOW OFTEN DO YOU HAVE A DRINK CONTAINING ALCOHOL: NEVER

## 2023-10-10 NOTE — ED NOTES
Radiology Procedure Waiver   Name: Archie Almazan  : 1970  MRN: [id number]    Date:  10/10/23    Time: 10:21 AM EDT    Benefits of immediately proceeding with Radiology exam(s) without pre-testing outweigh the risks or are not indicated as specified below and therefore the following is/are being waived:    [] Pregnancy test   [] Patients LMP on-time and regular.   [] Patient had Tubal Ligation or has other Contraception Device. [] Patient  is Menopausal or Premenarcheal.    [] Patient had Full or Partial Hysterectomy. [] Protocol for Iodine allergy    [] MRI Questionnaire     [x] BUN/Creatinine   [] Patient age w/no hx of renal dysfunction. [] Patient on Dialysis. [] Recent Normal Labs.   Electronically signed by Paulo Lieberman MD on 10/10/23 at 10:21 AM EDT               Paulo Lieberman MD  10/10/23 3709

## 2023-10-10 NOTE — DISCHARGE INSTRUCTIONS
Return if any new or worsening symptoms. Return if any chest pain or shortness of breath. Follow-up with your primary care provider. \

## 2023-10-10 NOTE — ED PROVIDER NOTES
[address] ENCOUNTER    Pt Name: Mike Santa  MRN: [id number]  [address] [facility] [place] 1970  Date of evaluation: 10/10/2023  Provider: Radha Echavarria MD  PCP: Asif Pendleton DO  Note Started: 10:21 AM EDT 10/10/23    HPI     Patient is a 46 y.o. male presents with a chief complaint of   Chief Complaint   Patient presents with    Dizziness     As soon as he opens his eyes room starts spinning and vomits. Emesis   . Dizziness. Patient stated that he has had 1 episodes of this 6 months ago. Now it is persistent. Patient stated that he woke up and had significant dizziness. Patient was also having nausea vomiting. Difficulty ambulating. Patient stated that he tried to take some medicine but threw up. Patient does not have chest pain or shortness of breath. Patient did not have any changes in urinary or bowel habits. Nursing Notes were all reviewed and agreed with or any disagreements were addressed in the HPI. History From: Patient    Review of Systems   Pertinent positives and negatives as per HPI. Physical Exam  Vitals and nursing note reviewed. Constitutional:       Appearance: He is well-developed. HENT:      Head: Normocephalic and atraumatic. Eyes:      Conjunctiva/sclera: Conjunctivae normal.   Cardiovascular:      Rate and Rhythm: Normal rate and regular rhythm. Heart sounds: Normal heart sounds. No murmur heard. Pulmonary:      Effort: Pulmonary effort is normal. No respiratory distress. Breath sounds: Normal breath sounds. No wheezing or rales. Abdominal:      General: Bowel sounds are normal.      Palpations: Abdomen is soft. Tenderness: There is no abdominal tenderness. There is no guarding or rebound. Musculoskeletal:         General: No tenderness or deformity. Cervical back: Normal range of motion and neck supple. Skin:     General: Skin is warm and dry.    Neurological:

## 2023-10-10 NOTE — ED NOTES
Patient became increasingly dizzy upon ambulation, provider notified      Jarret Salas RN  10/10/23 1146

## 2023-10-12 LAB
EKG ATRIAL RATE: 77 BPM
EKG P AXIS: 30 DEGREES
EKG P-R INTERVAL: 198 MS
EKG Q-T INTERVAL: 398 MS
EKG QRS DURATION: 84 MS
EKG QTC CALCULATION (BAZETT): 450 MS
EKG R AXIS: 1 DEGREES
EKG T AXIS: 32 DEGREES
EKG VENTRICULAR RATE: 77 BPM

## 2023-10-12 PROCEDURE — 93010 ELECTROCARDIOGRAM REPORT: CPT | Performed by: INTERNAL MEDICINE

## 2023-10-28 ENCOUNTER — HOSPITAL ENCOUNTER (INPATIENT)
Age: 53
LOS: 2 days | Discharge: HOME | DRG: 863 | End: 2023-10-30
Attending: EMERGENCY MEDICINE | Admitting: INTERNAL MEDICINE
Payer: COMMERCIAL

## 2023-10-28 ENCOUNTER — APPOINTMENT (OUTPATIENT)
Dept: ULTRASOUND IMAGING | Age: 53
DRG: 863 | End: 2023-10-28
Payer: COMMERCIAL

## 2023-10-28 ENCOUNTER — APPOINTMENT (OUTPATIENT)
Dept: GENERAL RADIOLOGY | Age: 53
DRG: 863 | End: 2023-10-28
Payer: COMMERCIAL

## 2023-10-28 DIAGNOSIS — T14.8XXA WOUND INFECTION: Primary | ICD-10-CM

## 2023-10-28 DIAGNOSIS — L03.116 CELLULITIS OF LEFT LOWER EXTREMITY: ICD-10-CM

## 2023-10-28 DIAGNOSIS — L08.9 WOUND INFECTION: Primary | ICD-10-CM

## 2023-10-28 PROBLEM — T81.41XA INFECTION INVOLVING SUTURE WITH ABSCESS: Status: ACTIVE | Noted: 2023-10-28

## 2023-10-28 LAB
ANION GAP SERPL CALCULATED.3IONS-SCNC: 9 MMOL/L (ref 7–16)
BASOPHILS # BLD: 0.02 K/UL (ref 0–0.2)
BASOPHILS NFR BLD: 0 % (ref 0–2)
BUN SERPL-MCNC: 13 MG/DL (ref 6–20)
CALCIUM SERPL-MCNC: 9.2 MG/DL (ref 8.6–10.2)
CHLORIDE SERPL-SCNC: 101 MMOL/L (ref 98–107)
CO2 SERPL-SCNC: 25 MMOL/L (ref 22–29)
CREAT SERPL-MCNC: 1 MG/DL (ref 0.7–1.2)
EOSINOPHIL # BLD: 0.05 K/UL (ref 0.05–0.5)
EOSINOPHILS RELATIVE PERCENT: 1 % (ref 0–6)
ERYTHROCYTE [DISTWIDTH] IN BLOOD BY AUTOMATED COUNT: 12.1 % (ref 11.5–15)
ERYTHROCYTE [SEDIMENTATION RATE] IN BLOOD BY WESTERGREN METHOD: 45 MM/HR (ref 0–15)
GFR SERPL CREATININE-BSD FRML MDRD: >60 ML/MIN/1.73M2
GLUCOSE SERPL-MCNC: 110 MG/DL (ref 74–99)
HCT VFR BLD AUTO: 36.1 % (ref 37–54)
HGB BLD-MCNC: 12.4 G/DL (ref 12.5–16.5)
IMM GRANULOCYTES # BLD AUTO: <0.03 K/UL (ref 0–0.58)
IMM GRANULOCYTES NFR BLD: 0 % (ref 0–5)
LACTATE BLDV-SCNC: 0.6 MMOL/L (ref 0.5–2.2)
LYMPHOCYTES NFR BLD: 1.44 K/UL (ref 1.5–4)
LYMPHOCYTES RELATIVE PERCENT: 18 % (ref 20–42)
MCH RBC QN AUTO: 32.2 PG (ref 26–35)
MCHC RBC AUTO-ENTMCNC: 34.3 G/DL (ref 32–34.5)
MCV RBC AUTO: 93.8 FL (ref 80–99.9)
MONOCYTES NFR BLD: 0.81 K/UL (ref 0.1–0.95)
MONOCYTES NFR BLD: 10 % (ref 2–12)
NEUTROPHILS NFR BLD: 70 % (ref 43–80)
NEUTS SEG NFR BLD: 5.54 K/UL (ref 1.8–7.3)
PLATELET # BLD AUTO: 299 K/UL (ref 130–450)
PMV BLD AUTO: 9.4 FL (ref 7–12)
POTASSIUM SERPL-SCNC: 4 MMOL/L (ref 3.5–5)
RBC # BLD AUTO: 3.85 M/UL (ref 3.8–5.8)
SODIUM SERPL-SCNC: 135 MMOL/L (ref 132–146)
WBC OTHER # BLD: 7.9 K/UL (ref 4.5–11.5)

## 2023-10-28 PROCEDURE — 80048 BASIC METABOLIC PNL TOTAL CA: CPT

## 2023-10-28 PROCEDURE — 2580000003 HC RX 258: Performed by: NURSE PRACTITIONER

## 2023-10-28 PROCEDURE — 2580000003 HC RX 258: Performed by: EMERGENCY MEDICINE

## 2023-10-28 PROCEDURE — 93971 EXTREMITY STUDY: CPT

## 2023-10-28 PROCEDURE — 87040 BLOOD CULTURE FOR BACTERIA: CPT

## 2023-10-28 PROCEDURE — 6360000002 HC RX W HCPCS: Performed by: NURSE PRACTITIONER

## 2023-10-28 PROCEDURE — 87205 SMEAR GRAM STAIN: CPT

## 2023-10-28 PROCEDURE — 83605 ASSAY OF LACTIC ACID: CPT

## 2023-10-28 PROCEDURE — 6360000002 HC RX W HCPCS: Performed by: EMERGENCY MEDICINE

## 2023-10-28 PROCEDURE — 99285 EMERGENCY DEPT VISIT HI MDM: CPT

## 2023-10-28 PROCEDURE — 85025 COMPLETE CBC W/AUTO DIFF WBC: CPT

## 2023-10-28 PROCEDURE — 73590 X-RAY EXAM OF LOWER LEG: CPT

## 2023-10-28 PROCEDURE — 96374 THER/PROPH/DIAG INJ IV PUSH: CPT

## 2023-10-28 PROCEDURE — 2060000000 HC ICU INTERMEDIATE R&B

## 2023-10-28 PROCEDURE — 85652 RBC SED RATE AUTOMATED: CPT

## 2023-10-28 PROCEDURE — 87070 CULTURE OTHR SPECIMN AEROBIC: CPT

## 2023-10-28 RX ORDER — MECLIZINE HYDROCHLORIDE 25 MG/1
25 TABLET ORAL 3 TIMES DAILY PRN
COMMUNITY

## 2023-10-28 RX ORDER — ACETAMINOPHEN 325 MG/1
650 TABLET ORAL EVERY 6 HOURS PRN
Status: DISCONTINUED | OUTPATIENT
Start: 2023-10-28 | End: 2023-10-30 | Stop reason: HOSPADM

## 2023-10-28 RX ORDER — ONDANSETRON 2 MG/ML
4 INJECTION INTRAMUSCULAR; INTRAVENOUS EVERY 6 HOURS PRN
Status: DISCONTINUED | OUTPATIENT
Start: 2023-10-28 | End: 2023-10-30 | Stop reason: HOSPADM

## 2023-10-28 RX ORDER — ONDANSETRON 4 MG/1
4 TABLET, ORALLY DISINTEGRATING ORAL EVERY 8 HOURS PRN
Status: DISCONTINUED | OUTPATIENT
Start: 2023-10-28 | End: 2023-10-30 | Stop reason: HOSPADM

## 2023-10-28 RX ORDER — SODIUM CHLORIDE 0.9 % (FLUSH) 0.9 %
10 SYRINGE (ML) INJECTION EVERY 12 HOURS SCHEDULED
Status: DISCONTINUED | OUTPATIENT
Start: 2023-10-28 | End: 2023-10-30 | Stop reason: HOSPADM

## 2023-10-28 RX ORDER — DIAZEPAM 5 MG/1
5 TABLET ORAL EVERY 6 HOURS PRN
COMMUNITY

## 2023-10-28 RX ORDER — DIAZEPAM 5 MG/1
5 TABLET ORAL EVERY 6 HOURS PRN
Status: DISCONTINUED | OUTPATIENT
Start: 2023-10-28 | End: 2023-10-30 | Stop reason: HOSPADM

## 2023-10-28 RX ORDER — HYDROCODONE BITARTRATE AND ACETAMINOPHEN 5; 325 MG/1; MG/1
1 TABLET ORAL EVERY 6 HOURS PRN
Status: DISCONTINUED | OUTPATIENT
Start: 2023-10-28 | End: 2023-10-30 | Stop reason: HOSPADM

## 2023-10-28 RX ORDER — ACETAMINOPHEN 650 MG/1
650 SUPPOSITORY RECTAL EVERY 6 HOURS PRN
Status: DISCONTINUED | OUTPATIENT
Start: 2023-10-28 | End: 2023-10-30 | Stop reason: HOSPADM

## 2023-10-28 RX ORDER — SODIUM CHLORIDE 0.9 % (FLUSH) 0.9 %
10 SYRINGE (ML) INJECTION PRN
Status: DISCONTINUED | OUTPATIENT
Start: 2023-10-28 | End: 2023-10-30 | Stop reason: HOSPADM

## 2023-10-28 RX ORDER — ENOXAPARIN SODIUM 100 MG/ML
30 INJECTION SUBCUTANEOUS 2 TIMES DAILY
Status: DISCONTINUED | OUTPATIENT
Start: 2023-10-28 | End: 2023-10-30 | Stop reason: HOSPADM

## 2023-10-28 RX ORDER — SODIUM CHLORIDE 9 MG/ML
INJECTION, SOLUTION INTRAVENOUS PRN
Status: DISCONTINUED | OUTPATIENT
Start: 2023-10-28 | End: 2023-10-30 | Stop reason: HOSPADM

## 2023-10-28 RX ORDER — DOXYCYCLINE HYCLATE 100 MG/1
100 CAPSULE ORAL 2 TIMES DAILY
Status: ON HOLD | COMMUNITY
End: 2023-10-30 | Stop reason: HOSPADM

## 2023-10-28 RX ORDER — MORPHINE SULFATE 4 MG/ML
4 INJECTION, SOLUTION INTRAMUSCULAR; INTRAVENOUS ONCE
Status: COMPLETED | OUTPATIENT
Start: 2023-10-28 | End: 2023-10-28

## 2023-10-28 RX ORDER — SODIUM CHLORIDE 9 MG/ML
INJECTION, SOLUTION INTRAVENOUS CONTINUOUS
Status: DISCONTINUED | OUTPATIENT
Start: 2023-10-28 | End: 2023-10-30 | Stop reason: HOSPADM

## 2023-10-28 RX ADMIN — ENOXAPARIN SODIUM 30 MG: 100 INJECTION SUBCUTANEOUS at 22:49

## 2023-10-28 RX ADMIN — WATER 2000 MG: 1 INJECTION INTRAMUSCULAR; INTRAVENOUS; SUBCUTANEOUS at 11:02

## 2023-10-28 RX ADMIN — MORPHINE SULFATE 4 MG: 4 INJECTION, SOLUTION INTRAMUSCULAR; INTRAVENOUS at 12:32

## 2023-10-28 RX ADMIN — VANCOMYCIN HYDROCHLORIDE 2500 MG: 10 INJECTION, POWDER, LYOPHILIZED, FOR SOLUTION INTRAVENOUS at 13:23

## 2023-10-28 RX ADMIN — SODIUM CHLORIDE: 9 INJECTION, SOLUTION INTRAVENOUS at 17:10

## 2023-10-28 NOTE — ED PROVIDER NOTES
[address]        Pt Name: Milo Kelly  MRN: [id number]  [facility] [facility] 1970  Date of evaluation: 10/28/2023  Provider: Harry Cornejo DO  PCP: Star Dong MD  Note Started: 9:10 AM EDT 10/28/23    CHIEF COMPLAINT       Chief Complaint   Patient presents with    Leg Injury     Recently injured leg at work, 12 stitches in left lower leg. Now swollen, red, painful. +fever/chills        HISTORY OF PRESENT ILLNESS: 1 or more Elements   History From: PATIENT     Limitations to history : None    Milo Kelly is a 46 y.o. male arriving after concerns for an infection of the left leg. He received 12 sutures at [facility] on Thursday after he had a work accident with a . He has been on doxycycline and missed 1 dose. He states that it is quite painful especially with walking and there has been some drainage from the area along with swelling and redness. He says he felt like he had the flu yesterday but has not recorded any fevers at home. Nursing Notes were all reviewed and agreed with or any disagreements were addressed in the HPI. REVIEW OF SYSTEMS :      Review of Systems    POSITIVE (+): swelling, erythema, pain, wound   NEGATIVE (-): fevers, chills, nausea, vomiting, diarrhea, constipation, shortness of breath, chest pain, abdominal pain      SURGICAL HISTORY   No past surgical history on file.  [facility]       Current Discharge Medication List        CONTINUE these medications which have NOT CHANGED    Details   ondansetron (ZOFRAN-ODT) 4 MG disintegrating tablet Take 1 tablet by mouth 3 times daily as needed for Nausea or Vomiting  Qty: 21 tablet, Refills: 0      betamethasone dipropionate (DIPROLENE) 0.05 % ointment Apply topically daily. Qty: 1 Tube, Refills: 3    Associated Diagnoses: Poison ivy dermatitis             ALLERGIES     Penicillins    FAMILYHISTORY     No family history on file.      SOCIAL HISTORY

## 2023-10-28 NOTE — PROGRESS NOTES
4 Eyes Skin Assessment     NAME:  Mike Santa  YOB: 1970  MEDICAL RECORD NUMBER:  [id number]    The patient is being assessed for  Admission    I agree that at least one RN has performed a thorough Head to Toe Skin Assessment on the patient. ALL assessment sites listed below have been assessed. Areas assessed by both nurses:    Sacrum. Buttock, Coccyx, Ischium        Does the Patient have a Wound? Yes wound(s) were present on assessment. LDA wound assessment was Initiated and completed by RN. Patient has a right lower extremity laceration that is infected.        Tuan Prevention initiated by RN: No  Wound Care Orders initiated by RN: No    Pressure Injury (Stage 3,4, Unstageable, DTI, NWPT, and Complex wounds) if present, place Wound referral order by RN under : No    New Ostomies, if present place, Ostomy referral order under : No     Nurse 1 eSignature: Electronically signed by Gage Santos RN on 10/28/23 at 6:21 PM EDT    **SHARE this note so that the co-signing nurse can place an eSignature**    Nurse 2 eSignature: {Esignature:[id number]}

## 2023-10-29 ENCOUNTER — APPOINTMENT (OUTPATIENT)
Dept: ULTRASOUND IMAGING | Age: 53
DRG: 863 | End: 2023-10-29
Payer: COMMERCIAL

## 2023-10-29 LAB
ANION GAP SERPL CALCULATED.3IONS-SCNC: 9 MMOL/L (ref 7–16)
BASOPHILS # BLD: 0.02 K/UL (ref 0–0.2)
BASOPHILS NFR BLD: 0 % (ref 0–2)
BUN SERPL-MCNC: 13 MG/DL (ref 6–20)
CALCIUM SERPL-MCNC: 8.9 MG/DL (ref 8.6–10.2)
CHLORIDE SERPL-SCNC: 103 MMOL/L (ref 98–107)
CO2 SERPL-SCNC: 24 MMOL/L (ref 22–29)
CREAT SERPL-MCNC: 1 MG/DL (ref 0.7–1.2)
CRP SERPL HS-MCNC: 58 MG/L (ref 0–5)
EOSINOPHIL # BLD: 0.08 K/UL (ref 0.05–0.5)
EOSINOPHILS RELATIVE PERCENT: 2 % (ref 0–6)
ERYTHROCYTE [DISTWIDTH] IN BLOOD BY AUTOMATED COUNT: 11.9 % (ref 11.5–15)
GFR SERPL CREATININE-BSD FRML MDRD: >60 ML/MIN/1.73M2
GLUCOSE SERPL-MCNC: 126 MG/DL (ref 74–99)
HCT VFR BLD AUTO: 33.6 % (ref 37–54)
HGB BLD-MCNC: 11.2 G/DL (ref 12.5–16.5)
IMM GRANULOCYTES # BLD AUTO: <0.03 K/UL (ref 0–0.58)
IMM GRANULOCYTES NFR BLD: 0 % (ref 0–5)
LYMPHOCYTES NFR BLD: 1.46 K/UL (ref 1.5–4)
LYMPHOCYTES RELATIVE PERCENT: 31 % (ref 20–42)
MCH RBC QN AUTO: 31.5 PG (ref 26–35)
MCHC RBC AUTO-ENTMCNC: 33.3 G/DL (ref 32–34.5)
MCV RBC AUTO: 94.6 FL (ref 80–99.9)
MONOCYTES NFR BLD: 0.6 K/UL (ref 0.1–0.95)
MONOCYTES NFR BLD: 13 % (ref 2–12)
NEUTROPHILS NFR BLD: 54 % (ref 43–80)
NEUTS SEG NFR BLD: 2.53 K/UL (ref 1.8–7.3)
PLATELET # BLD AUTO: 269 K/UL (ref 130–450)
PMV BLD AUTO: 9.4 FL (ref 7–12)
POTASSIUM SERPL-SCNC: 4.2 MMOL/L (ref 3.5–5)
RBC # BLD AUTO: 3.55 M/UL (ref 3.8–5.8)
SODIUM SERPL-SCNC: 136 MMOL/L (ref 132–146)
WBC OTHER # BLD: 4.7 K/UL (ref 4.5–11.5)

## 2023-10-29 PROCEDURE — 2580000003 HC RX 258: Performed by: NURSE PRACTITIONER

## 2023-10-29 PROCEDURE — 85025 COMPLETE CBC W/AUTO DIFF WBC: CPT

## 2023-10-29 PROCEDURE — 2060000000 HC ICU INTERMEDIATE R&B

## 2023-10-29 PROCEDURE — 80048 BASIC METABOLIC PNL TOTAL CA: CPT

## 2023-10-29 PROCEDURE — 6360000002 HC RX W HCPCS: Performed by: NURSE PRACTITIONER

## 2023-10-29 PROCEDURE — 6370000000 HC RX 637 (ALT 250 FOR IP): Performed by: NURSE PRACTITIONER

## 2023-10-29 PROCEDURE — 76999 ECHO EXAMINATION PROCEDURE: CPT

## 2023-10-29 PROCEDURE — 86140 C-REACTIVE PROTEIN: CPT

## 2023-10-29 RX ADMIN — ENOXAPARIN SODIUM 30 MG: 100 INJECTION SUBCUTANEOUS at 22:30

## 2023-10-29 RX ADMIN — WATER 1000 MG: 1 INJECTION INTRAMUSCULAR; INTRAVENOUS; SUBCUTANEOUS at 11:20

## 2023-10-29 RX ADMIN — HYDROCODONE BITARTRATE AND ACETAMINOPHEN 1 TABLET: 5; 325 TABLET ORAL at 22:31

## 2023-10-29 RX ADMIN — ENOXAPARIN SODIUM 30 MG: 100 INJECTION SUBCUTANEOUS at 08:52

## 2023-10-29 RX ADMIN — SODIUM CHLORIDE: 9 INJECTION, SOLUTION INTRAVENOUS at 06:40

## 2023-10-29 RX ADMIN — SODIUM CHLORIDE, PRESERVATIVE FREE 10 ML: 5 INJECTION INTRAVENOUS at 22:30

## 2023-10-29 RX ADMIN — SODIUM CHLORIDE: 9 INJECTION, SOLUTION INTRAVENOUS at 16:42

## 2023-10-29 ASSESSMENT — PAIN DESCRIPTION - LOCATION: LOCATION: LEG

## 2023-10-29 ASSESSMENT — PAIN - FUNCTIONAL ASSESSMENT: PAIN_FUNCTIONAL_ASSESSMENT: ACTIVITIES ARE NOT PREVENTED

## 2023-10-29 ASSESSMENT — PAIN SCALES - GENERAL
PAINLEVEL_OUTOF10: 4
PAINLEVEL_OUTOF10: 6

## 2023-10-29 ASSESSMENT — PAIN DESCRIPTION - DESCRIPTORS: DESCRIPTORS: ACHING

## 2023-10-29 ASSESSMENT — PAIN DESCRIPTION - PAIN TYPE: TYPE: ACUTE PAIN

## 2023-10-29 ASSESSMENT — PAIN DESCRIPTION - ORIENTATION: ORIENTATION: LEFT

## 2023-10-29 NOTE — H&P
[facility]  History and Physical      CHIEF COMPLAINT:    Chief Complaint   Patient presents with    Leg Injury     Recently injured leg at work, 12 stitches in left lower leg. Now swollen, red, painful. +fever/chills         Patient of Mara Putnam MD presents with: Infection involving suture with abscess    History of Present Illness:   Patient is a 50-year-old male without a significant past medical history. Patient presented to the ED with concerns of an infection of his left lower extremity. Patient recently received 12 sutures at [facility] on this past Thursday after he had a work accident with a . Patient has been on doxycycline however he did miss 1 dose. Patient states his leg is quite painful especially with walking and there is been some drainage around the sutures as well as swelling and redness. Patient states he felt like he had the flu yesterday however he has not had any increased temps. ER work-up was negative patient started on Rocephin and patient is admitted to telemetry unit for further treatment. On evaluation resting comfortably in no acute distress. Leg appears markedly improved compared to admission time. REVIEW OF SYSTEMS:  Pertinent negatives are above in HPI. 10 point ROS otherwise negative. History reviewed. No pertinent past medical history. Past Surgical History:   Procedure Laterality Date    ABDOMEN SURGERY         Medications Prior to Admission:    Medications Prior to Admission: meclizine (ANTIVERT) 25 MG tablet, Take 1 tablet by mouth 3 times daily as needed for Dizziness  diazePAM (VALIUM) 5 MG tablet, Take 1 tablet by mouth every 6 hours as needed for Anxiety.  Max Daily Amount: 20 mg  doxycycline hyclate (VIBRAMYCIN) 100 MG capsule, Take 1 capsule by mouth 2 times daily  ondansetron (ZOFRAN-ODT) 4 MG disintegrating tablet, Take 1 tablet by mouth 3 times daily as needed for Nausea or Vomiting  betamethasone dipropionate Unna Boot Text: An Unna boot was placed to help immobilize the limb and facilitate more rapid healing.

## 2023-10-30 VITALS
WEIGHT: 236 LBS | OXYGEN SATURATION: 98 % | HEIGHT: 71 IN | DIASTOLIC BLOOD PRESSURE: 76 MMHG | TEMPERATURE: 98 F | RESPIRATION RATE: 16 BRPM | BODY MASS INDEX: 33.04 KG/M2 | SYSTOLIC BLOOD PRESSURE: 125 MMHG | HEART RATE: 72 BPM

## 2023-10-30 LAB
ANION GAP SERPL CALCULATED.3IONS-SCNC: 8 MMOL/L (ref 7–16)
BASOPHILS # BLD: 0.03 K/UL (ref 0–0.2)
BASOPHILS NFR BLD: 1 % (ref 0–2)
BUN SERPL-MCNC: 12 MG/DL (ref 6–20)
CALCIUM SERPL-MCNC: 9.1 MG/DL (ref 8.6–10.2)
CHLORIDE SERPL-SCNC: 105 MMOL/L (ref 98–107)
CO2 SERPL-SCNC: 25 MMOL/L (ref 22–29)
CREAT SERPL-MCNC: 1 MG/DL (ref 0.7–1.2)
EOSINOPHIL # BLD: 0.12 K/UL (ref 0.05–0.5)
EOSINOPHILS RELATIVE PERCENT: 3 % (ref 0–6)
ERYTHROCYTE [DISTWIDTH] IN BLOOD BY AUTOMATED COUNT: 11.9 % (ref 11.5–15)
GFR SERPL CREATININE-BSD FRML MDRD: >60 ML/MIN/1.73M2
GLUCOSE SERPL-MCNC: 110 MG/DL (ref 74–99)
HCT VFR BLD AUTO: 32.4 % (ref 37–54)
HGB BLD-MCNC: 10.9 G/DL (ref 12.5–16.5)
IMM GRANULOCYTES # BLD AUTO: <0.03 K/UL (ref 0–0.58)
IMM GRANULOCYTES NFR BLD: 0 % (ref 0–5)
LYMPHOCYTES NFR BLD: 1.63 K/UL (ref 1.5–4)
LYMPHOCYTES RELATIVE PERCENT: 39 % (ref 20–42)
MCH RBC QN AUTO: 31.7 PG (ref 26–35)
MCHC RBC AUTO-ENTMCNC: 33.6 G/DL (ref 32–34.5)
MCV RBC AUTO: 94.2 FL (ref 80–99.9)
MONOCYTES NFR BLD: 0.48 K/UL (ref 0.1–0.95)
MONOCYTES NFR BLD: 12 % (ref 2–12)
NEUTROPHILS NFR BLD: 46 % (ref 43–80)
NEUTS SEG NFR BLD: 1.9 K/UL (ref 1.8–7.3)
PLATELET # BLD AUTO: 270 K/UL (ref 130–450)
PMV BLD AUTO: 9 FL (ref 7–12)
POTASSIUM SERPL-SCNC: 4.2 MMOL/L (ref 3.5–5)
RBC # BLD AUTO: 3.44 M/UL (ref 3.8–5.8)
SODIUM SERPL-SCNC: 138 MMOL/L (ref 132–146)
WBC OTHER # BLD: 4.2 K/UL (ref 4.5–11.5)

## 2023-10-30 PROCEDURE — 80048 BASIC METABOLIC PNL TOTAL CA: CPT

## 2023-10-30 PROCEDURE — 85025 COMPLETE CBC W/AUTO DIFF WBC: CPT

## 2023-10-30 PROCEDURE — 2580000003 HC RX 258: Performed by: NURSE PRACTITIONER

## 2023-10-30 PROCEDURE — 6360000002 HC RX W HCPCS: Performed by: NURSE PRACTITIONER

## 2023-10-30 RX ORDER — CEPHALEXIN 500 MG/1
500 CAPSULE ORAL 2 TIMES DAILY
Qty: 14 CAPSULE | Refills: 0 | Status: SHIPPED | OUTPATIENT
Start: 2023-10-30 | End: 2023-11-06

## 2023-10-30 RX ORDER — DOXYCYCLINE HYCLATE 100 MG
100 TABLET ORAL 2 TIMES DAILY
Qty: 14 TABLET | Refills: 0 | Status: SHIPPED | OUTPATIENT
Start: 2023-10-30 | End: 2023-11-06

## 2023-10-30 RX ADMIN — WATER 1000 MG: 1 INJECTION INTRAMUSCULAR; INTRAVENOUS; SUBCUTANEOUS at 12:04

## 2023-10-30 RX ADMIN — SODIUM CHLORIDE: 9 INJECTION, SOLUTION INTRAVENOUS at 06:23

## 2023-10-30 RX ADMIN — ENOXAPARIN SODIUM 30 MG: 100 INJECTION SUBCUTANEOUS at 09:06

## 2023-10-30 ASSESSMENT — PAIN SCALES - GENERAL
PAINLEVEL_OUTOF10: 3
PAINLEVEL_OUTOF10: 3
PAINLEVEL_OUTOF10: 1

## 2023-10-30 ASSESSMENT — PAIN DESCRIPTION - ORIENTATION
ORIENTATION: LEFT;LOWER
ORIENTATION: LEFT;LOWER

## 2023-10-30 ASSESSMENT — PAIN DESCRIPTION - LOCATION
LOCATION: LEG
LOCATION: LEG

## 2023-10-30 ASSESSMENT — PAIN DESCRIPTION - DESCRIPTORS
DESCRIPTORS: ACHING
DESCRIPTORS: ACHING

## 2023-10-30 ASSESSMENT — PAIN DESCRIPTION - ONSET
ONSET: GRADUAL
ONSET: GRADUAL

## 2023-10-30 ASSESSMENT — PAIN DESCRIPTION - FREQUENCY
FREQUENCY: INTERMITTENT
FREQUENCY: INTERMITTENT

## 2023-10-30 ASSESSMENT — PAIN DESCRIPTION - PAIN TYPE
TYPE: ACUTE PAIN
TYPE: ACUTE PAIN

## 2023-10-30 NOTE — PLAN OF CARE
Problem: Discharge Planning  Goal: Discharge to home or other facility with appropriate resources  10/30/2023 0039 by Cheryle Michaels, RN  Outcome: Progressing  10/29/2023 1241 by Milagros Denson RN  Outcome: Progressing     Problem: ABCDS Injury Assessment  Goal: Absence of physical injury  10/30/2023 0039 by Cheryle Michaels, RN  Outcome: Progressing  10/29/2023 1241 by Milagros Denson RN  Outcome: Progressing     Problem: Pain  Goal: Verbalizes/displays adequate comfort level or baseline comfort level  Outcome: Progressing

## 2023-10-30 NOTE — CARE COORDINATION
Introduced my self and provided explanation of CM role to patient. Patient is awake, alert, and aware of current diagnosis and treatment plan including ortho consult, atb therapy. He voices he resides at home with his spouse and completes his adl's with independence. Patient is established with a pcp and denies any issue with retail pharmaceutical coverage. He adds that he has filed [address] claim with claim number [id number]  He also states he understands he may need to utilize his primary medical coverage until [address] claim is finalized. PCP notes indicate provable plan for PO atb therapy  With that plan, patient indicates he would have no post discharge needs. If IV's would be required, CM can follow up and provide options for same. Explained ELOS of 24 hours or less; patient voiced understanding and agreement. Jacobo Vicente, MSN RN  [facility] Case Management  [phone]

## 2023-10-30 NOTE — CONSULTS
Department of Orthopedic Surgery  Consult Note    Reason for Consult: Left leg injury    HISTORY OF PRESENT ILLNESS:       Patient is a 46 y.o. male who presents with a 4-day history of pain in the left leg following an injury contracted at work. Patient stated that he was hit by a  and sustained a laceration in the inside of his left lower leg. Reports immediate bleeding and pain. He was initially evaluated at [facility] emergency department where his wound was irrigated and approximated with nylon suture. Patient was later discharged with a 14-day course of doxycycline, and instructed to follow-up with PCP for suture removal and wound check. [facility], after he noticed worsening of the redness of his wound, with increasing pain, and fever/chills sensations. His sutures were removed. Patient denies any other injuries. His orthopedic history is unremarkable. He is an independent ambulator at baseline with use of no assistive device. Currently, patient is able to ambulate with no significant discomfort. Denies any neurological symptoms to the injured extremity. Denies any shortness of breath, chest pain or palpitation. He was admitted by the medicine team and currently receiving IV antibiotics in the form of Rocephin 1 g. Ports significant improvement compared to prior admission. He would like to go home today. Past Medical History:    History reviewed. No pertinent past medical history.   Past Surgical History:        Procedure Laterality Date    ABDOMEN SURGERY       Current Medications:   Current Facility-Administered Medications: diazePAM (VALIUM) tablet 5 mg, 5 mg, Oral, Q6H PRN  0.9 % sodium chloride infusion, , IntraVENous, Continuous  sodium chloride flush 0.9 % injection 10 mL, 10 mL, IntraVENous, 2 times per day  sodium chloride flush 0.9 % injection 10 mL, 10 mL, IntraVENous, PRN  0.9 % sodium chloride infusion, , IntraVENous,

## 2023-10-30 NOTE — PLAN OF CARE
Problem: Discharge Planning  Goal: Discharge to home or other facility with appropriate resources  10/30/2023 1638 by Kayla Gray RN  Outcome: Adequate for Discharge  10/30/2023 1638 by Kyala Gray RN  Outcome: Progressing     Problem: ABCDS Injury Assessment  Goal: Absence of physical injury  10/30/2023 1638 by Kayla Gray RN  Outcome: Adequate for Discharge  10/30/2023 1638 by Kayla Gray RN  Outcome: Progressing     Problem: Pain  Goal: Verbalizes/displays adequate comfort level or baseline comfort level  10/30/2023 1638 by Kayla Gray RN  Outcome: Adequate for Discharge  10/30/2023 1638 by Kayla Gray RN  Outcome: Progressing

## 2023-10-31 LAB
MICROORGANISM SPEC CULT: NO GROWTH
MICROORGANISM/AGENT SPEC: NORMAL
SPECIMEN DESCRIPTION: NORMAL

## 2023-10-31 NOTE — PROGRESS NOTES
CLINICAL PHARMACY NOTE: MEDS TO BEDS    Total # of Prescriptions Filled: 2   The following medications were delivered to the patient:  Cephalexin 500 mg   Doxycycline 100 mg     Additional Documentation:

## 2023-11-02 LAB
MICROORGANISM SPEC CULT: NORMAL
MICROORGANISM SPEC CULT: NORMAL
SERVICE CMNT-IMP: NORMAL
SERVICE CMNT-IMP: NORMAL
SPECIMEN DESCRIPTION: NORMAL
SPECIMEN DESCRIPTION: NORMAL

## 2023-11-02 NOTE — DISCHARGE SUMMARY
[facility]   Discharge summary   Patient ID:  Carol Hopper  [id number]  46 y.o.  1970    Admit date: 10/28/2023    Discharge date and time: 10/30/23    Admission Diagnoses:   Patient Active Problem List   Diagnosis    COVID-19    Bronchitis    Infection involving suture with abscess       Discharge Diagnoses: Infection involving suture with abscess    Consults: orthopedic surgery    Procedures: none    Hospital Course:     62-year-old male with a recent history of a leg injury at work presents to the ED with complaints of fatigue, redness and swelling to his left lower extremity is admitted to telemetry unit with     Infectious involving suture with abscess  Monitor labs-WBC 4.7  Check sed rate elevated at 45  Check CRP  Patient started on Rocephin 1 g every 24 hours  IV hydration  Monitor for temps  Right lower extremity ultrasound to rule out underlying abscess-if this is the case then general surgery consult manage wise continue diet and transition to p.o. Keflex/Doxy at discharge     10/30/23:  -CRP 58.0  -Ultrasound of soft tissue with no evidence of fluid collection to suggest an abscess of left lower extremity  -Other labs stable,   -Vitals stable  -Possible discharge later this afternoon with transition of IV ABX to p.o.-discharge on p.o. Keflex with doxycycline-penicillin allergy is listed however he has been tolerating Rocephin in the hospital without any problems        No results for input(s): \"WBC\", \"HGB\", \"HCT\", \"PLT\" in the last 72 hours. No results for input(s): \"NA\", \"K\", \"CL\", \"CO2\", \"BUN\", \"CREATININE\", \"GLU\", \"CALCIUM\" in the last 72 hours. US SOFT TISSUE LIMITED AREA    Result Date: 10/29/2023  EXAMINATION: SOFT TISSUE ULTRASOUND 10/29/2023 1:27 pm COMPARISON: None.  HISTORY: ORDERING SYSTEM PROVIDED HISTORY: to rule out left lower extremity abcesses TECHNOLOGIST PROVIDED HISTORY: Reason for exam:->to rule out left lower extremity abcesses What reading provider will be

## 2024-08-06 ENCOUNTER — HOSPITAL ENCOUNTER (OUTPATIENT)
Dept: CT IMAGING | Age: 54
Discharge: HOME | End: 2024-08-08
Payer: OTHER GOVERNMENT

## 2024-08-06 DIAGNOSIS — Z57.5 OCCUPATIONAL EXPOSURE TO INDUSTRIAL TOXINS: ICD-10-CM

## 2024-08-06 PROCEDURE — 71250 CT THORAX DX C-: CPT

## 2024-08-06 SDOH — HEALTH STABILITY - PHYSICAL HEALTH: OCCUPATIONAL EXPOSURE TO TOXIC AGENTS IN OTHER INDUSTRIES: Z57.5

## 2024-08-20 NOTE — PROGRESS NOTES
Dr. Karen Tavarez notified of consult. Yevgeniy ARMSTRONG Layla  8/20/2024        IMPRESSION/PLAN:     73-year-old     4-year history of shortness of breath and chest tightness with exertion  Currently admitted with shortness of breath with exertion that seems to worsen  Exam unremarkable  EKG with sinus tachycardia and no significant ST-T changes troponin negative  Abnormal CT chest with severe calcification of the left main and left anterior descending and left circumflex coronaries  CT chest also significant for pulmonary edema  Echo with normal EF and mild AR     Above raises the possibility of significant coronary ischemia leading to LV dysfunction and pulmonary edema    Blood pressures have been intermittently elevated    Further management after cardiac catheterization    Carlito Boyle MD      Symptoms: No symptoms overnight, no chest pain or shortness of breath.  Has not ambulated much      REVIEW OF SYSTEMS:   No new concerns noted.    Patient Active Problem List   Diagnosis    Acute pulmonary edema  (CMD)         PHYSICAL EXAMINATION:    Patient Vitals for the past 24 hrs:   BP Temp Temp src Pulse Resp SpO2 Weight   08/20/24 0432 124/78 98 °F (36.7 °C) Temporal 83 18 97 % 82.1 kg (180 lb 14.4 oz)   08/19/24 2132 (!) 148/82 97.5 °F (36.4 °C) Temporal 80 18 95 % --   08/19/24 1126 (!) 152/84 97 °F (36.1 °C) Temporal 80 18 96 % --   08/19/24 1004 117/76 -- -- 74 -- 95 % --         Intake/Output Summary (Last 24 hours) at 8/20/2024 0815  Last data filed at 8/20/2024 0733  Gross per 24 hour   Intake 1733 ml   Output 3325 ml   Net -1592 ml       GENERAL:  Well-developed, well-nourished, no acute distress.  EYE EXAM:  No pallor, no jaundice, no xanthelasma.  ORAL CAVITY:  No cyanosis.  NECK:  No JVD, bruits or thyromegaly.  CHEST WALL:  No tenderness, no deformity.  LUNGS:  Clear to auscultation.  Respiratory effort is normal.  No respiratory distress.  CARDIOVASCULAR:  S1, S2, normal.  No murmurs, gallops or rubs.  No precordial lifts.  ABDOMEN:  Non  tender.  Bowel sounds are present.  No bruits.  No hepatosplenomegaly.  EXTREMITIES:  No edema, cyanosis or clubbing.  SKIN:  No rashes noted.  NEUROLOGICAL EXAM:  Patient is oriented x3.  Mood and affect are normal.  PSYCHIATRIC:  Speech and behavior appropriate.      CMP  Recent Labs   Lab 08/20/24  0538 08/19/24  0546 08/18/24  2331 08/18/24  2327   SODIUM 134* 138  --  142   POTASSIUM 3.6 4.3  --  4.0   CHLORIDE 96* 103  --  105   CO2 32 27  --  27   ANIONGAP 10 12  --  14   BUN 13 12  --  16   CREATININE 1.17 0.91  --  0.92   GLUCOSE 142* 183*  --  118*   CALCIUM 9.0 8.5  --  8.6   ALBUMIN 4.0 3.5*  --  3.5*   MG 1.5* 1.4* 1.6*  --    AST 14 16  --  20   GPT 27 28  --  31   ALKPT 99 86  --  86   BILIRUBIN 1.2* 0.5  --  0.4   LACTA  --   --   --  1.7     CBC  Recent Labs   Lab 08/20/24  0538 08/19/24  0546 08/18/24  2327   WBC 8.4 9.4 7.4   ANEUT 4.8 7.6 4.8   RBC 5.34 4.87 4.90   HGB 15.4 14.0 14.1   HCT 45.3 40.8 41.3    228 246     CARDIAC  No results found  LIPIDS  Recent Labs   Lab 08/18/24  2331   CHOLESTEROL 183   TRIGLYCERIDE 138   HDL 69   CALCLDL 86     No results found for: \"NTPROBNP\"   No results found for: \"TROPIHIGSEN\"